# Patient Record
(demographics unavailable — no encounter records)

---

## 2025-07-30 NOTE — ASSESSMENT
[FreeTextEntry1] : 46 yo female presenting today with left pes cavus deformity, metatarsalgia, 2nd-4th metatarsal stress reaction. Recommend non-surgical management. -LLE WBAT in short cam boot, rx given today -Avoid strenuous/impact related activities -Discussed with patient that due to the pes cavus deformity of her foot, that she is predisposed to developing stress reactions, understanding expressed -Rest, ice, compression, elevation, NSAIDs PRN for pain. -All questions answered -F/u 6 weeks   The diagnosis was explained in detail. The potential non-surgical and surgical treatments were reviewed. The relative risks and benefits of each option were considered relative to the patients age, activity level, medical history, symptom severity and previously attempted treatments.   The patient was advised to consult with their primary medical provider prior to initiation of any new medications to reduce the risk of adverse effects specific to their long-term home medications and medical history.   The patient's current medications management of their orthopedic diagnosis was reviewed today:   1) We discussed a comprehensive treatment plan that included possible pharmaceutical management involving the use of prescription strength medications including but not limited to options as oral Naprosyn 500mg BID, once daily Meloxicam 15 mg, or 500-650 mg Tylenol versus over-the-counter oral medications and topical prescriptions NSAID Pennsaid vs over the counter Voltaren gel.   2) There is a moderate risk of morbidity with further treatment, especially from use of prescription strength medications and possible side effects of these medications which include upset stomach with oral medications, skin reactions to topical medications and cardiac/renal issues with long term use.   3) I recommended that the patient follow-up with their medical physician to discuss any significant specific potential issues with long term medication use such as interactions with current medications or with exacerbation of underlying medical comorbidities.   4) The benefits and risks associated with use of injectable, oral or topical, prescription and over the counter anti-inflammatory medications were discussed with the patient. The patient voiced understanding of the risks including but not limited to bleeding, stroke, kidney dysfunction, heart disease, and were referred to the black box warning label for further information  Entered by Isaias Mosley PA-C acting as scribe. Dr. Colindres Attestation The documentation recorded by the scribe, in my presence, accurately reflects the service I, Dr. Colindres, personally performed, and the decisions made by me with my edits as appropriate.

## 2025-07-30 NOTE — HISTORY OF PRESENT ILLNESS
[Sudden] : sudden [Dull/Aching] : dull/aching [Household chores] : household chores [Leisure] : leisure [Social interactions] : social interactions [Rest] : rest [8] : 8 [4] : 4 [Constant] : constant [Full time] : Work status: full time [de-identified] : Patient here for today for her left foot. Pain began a few weeks ago. Patient notes that she was swimming and kicked the wall and notes the next day the pain came on. Patient noting a constant dull pain over the dorsum of the forefoot and great toe that is worse with ambulation. Patient denies taking medications for pain at this time. Patient came into the office ambulating in sneakers without assistive devices. Denies prior injury.   [] : Post Surgical Visit: no [FreeTextEntry1] : Left foot [FreeTextEntry3] : a few weeks ago [FreeTextEntry5] : Patient notes that she was swimming and kicked the wall and notes the next day the pain came on. 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

## 2025-07-30 NOTE — PHYSICAL EXAM
[de-identified] : Examination of the left foot and ankle is as follows: INSPECTION: pes cavus deformity, but no abrasion, no laceration, no erythema, no ecchymosis PALPATION: 1st metatarsophalangeal joint tenderness, ttp over 1st intermetatarsal space, ttp over 2nd, 3rd, and 4th metatarsal shafts ROM: dorsiflexion 20 degrees, plantar flexion 40 degrees, inversion 30 degrees, eversion 20 degrees. STRENGTH: dorsiflexion 5/5. plantar flexion 5/5, inversion 5/5, eversion 5/5, EHL 4/5, FHL 4/5 VASCULAR: dorsalis pedis pulse: 2+, posterior tibialis pulse: 2+ NEURO: Sensation present to light touch in all distributions GAIT: mildly antalgic, but patient ambulates without assistive device   X-rays of the left foot is as follows: Foot 3 view AP/Lateral/Oblique: